# Patient Record
Sex: FEMALE | Race: WHITE | NOT HISPANIC OR LATINO | Employment: STUDENT | URBAN - METROPOLITAN AREA
[De-identification: names, ages, dates, MRNs, and addresses within clinical notes are randomized per-mention and may not be internally consistent; named-entity substitution may affect disease eponyms.]

---

## 2021-10-01 ENCOUNTER — IMMUNIZATION (OUTPATIENT)
Dept: PRIMARY CARE CLINIC | Facility: CLINIC | Age: 21
End: 2021-10-01

## 2021-10-01 DIAGNOSIS — Z23 NEED FOR VACCINATION: Primary | ICD-10-CM

## 2021-10-01 PROCEDURE — 0001A COVID-19, MRNA, LNP-S, PF, 30 MCG/0.3 ML DOSE VACCINE: CPT | Mod: CV19,PBBFAC | Performed by: FAMILY MEDICINE

## 2022-08-15 ENCOUNTER — OFFICE VISIT (OUTPATIENT)
Dept: OPHTHALMOLOGY | Facility: CLINIC | Age: 22
End: 2022-08-15
Payer: COMMERCIAL

## 2022-08-15 DIAGNOSIS — H52.13 MYOPIA OF BOTH EYES WITH ASTIGMATISM: ICD-10-CM

## 2022-08-15 DIAGNOSIS — R51.9 FREQUENT HEADACHES: Primary | ICD-10-CM

## 2022-08-15 DIAGNOSIS — H53.8 BLURRED VISION, BILATERAL: ICD-10-CM

## 2022-08-15 DIAGNOSIS — H52.203 MYOPIA OF BOTH EYES WITH ASTIGMATISM: ICD-10-CM

## 2022-08-15 PROCEDURE — 99999 PR PBB SHADOW E&M-EST. PATIENT-LVL I: CPT | Mod: PBBFAC,,, | Performed by: OPTOMETRIST

## 2022-08-15 PROCEDURE — 92015 DETERMINE REFRACTIVE STATE: CPT | Mod: S$GLB,,, | Performed by: OPTOMETRIST

## 2022-08-15 PROCEDURE — 92004 PR EYE EXAM, NEW PATIENT,COMPREHESV: ICD-10-PCS | Mod: S$GLB,,, | Performed by: OPTOMETRIST

## 2022-08-15 PROCEDURE — 1159F PR MEDICATION LIST DOCUMENTED IN MEDICAL RECORD: ICD-10-PCS | Mod: CPTII,S$GLB,, | Performed by: OPTOMETRIST

## 2022-08-15 PROCEDURE — 92004 COMPRE OPH EXAM NEW PT 1/>: CPT | Mod: S$GLB,,, | Performed by: OPTOMETRIST

## 2022-08-15 PROCEDURE — 1159F MED LIST DOCD IN RCRD: CPT | Mod: CPTII,S$GLB,, | Performed by: OPTOMETRIST

## 2022-08-15 PROCEDURE — 1160F PR REVIEW ALL MEDS BY PRESCRIBER/CLIN PHARMACIST DOCUMENTED: ICD-10-PCS | Mod: CPTII,S$GLB,, | Performed by: OPTOMETRIST

## 2022-08-15 PROCEDURE — 99999 PR PBB SHADOW E&M-EST. PATIENT-LVL I: ICD-10-PCS | Mod: PBBFAC,,, | Performed by: OPTOMETRIST

## 2022-08-15 PROCEDURE — 1160F RVW MEDS BY RX/DR IN RCRD: CPT | Mod: CPTII,S$GLB,, | Performed by: OPTOMETRIST

## 2022-08-15 PROCEDURE — 92015 PR REFRACTION: ICD-10-PCS | Mod: S$GLB,,, | Performed by: OPTOMETRIST

## 2022-08-15 NOTE — PROGRESS NOTES
HPI     Annual Exam     Comments: Vision changes since last eye exam?: yes, after low dose chemo   and steroids   Any eye pain today: no  Other ocular symptoms: migraines with scatoma's   Interested in contact lens fitting today? Yes   Family history macular degeneration, glaucoma and fukes dystrophy                      Last edited by Emilee Gardner MA on 8/15/2022 12:57 PM. (History)            Assessment /Plan     For exam results, see Encounter Report.    Headaches  Not likely ocular in origin  Minimal refractive error  Normal, well-perfused optic nerves bilaterally with no edema  Continue care with PCP and/or specialists     Blurred vision, bilateral    Myopia of both eyes with astigmatism    Eyeglass Final Rx     Eyeglass Final Rx       Sphere Cylinder Axis    Right -0.50 +0.50 078    Left -0.50 +0.50 175    Type: SVL    Expiration Date: 8/15/2023   PD=63               Mild astigmatism OU  I feel like va will be better with specs  If she desires cls, RTC for fitting undilated PRN, but expect better va with specs      RTC 1 yr for undilated eye exam or PRN if any problems.   Discussed above and answered questions.

## 2023-03-06 ENCOUNTER — OFFICE VISIT (OUTPATIENT)
Dept: URGENT CARE | Facility: CLINIC | Age: 23
End: 2023-03-06
Payer: COMMERCIAL

## 2023-03-06 VITALS
BODY MASS INDEX: 29.14 KG/M2 | HEART RATE: 98 BPM | SYSTOLIC BLOOD PRESSURE: 120 MMHG | HEIGHT: 64 IN | WEIGHT: 170.69 LBS | TEMPERATURE: 98 F | OXYGEN SATURATION: 99 % | RESPIRATION RATE: 18 BRPM | DIASTOLIC BLOOD PRESSURE: 82 MMHG

## 2023-03-06 DIAGNOSIS — R68.89 FLU-LIKE SYMPTOMS: ICD-10-CM

## 2023-03-06 DIAGNOSIS — J32.9 SINUSITIS, UNSPECIFIED CHRONICITY, UNSPECIFIED LOCATION: Primary | ICD-10-CM

## 2023-03-06 DIAGNOSIS — Z11.52 ENCOUNTER FOR SCREENING FOR SEVERE ACUTE RESPIRATORY SYNDROME CORONAVIRUS 2 (SARS-COV-2) INFECTION: ICD-10-CM

## 2023-03-06 LAB
CTP QC/QA: YES
CTP QC/QA: YES
FLUAV AG NPH QL: NEGATIVE
FLUBV AG NPH QL: NEGATIVE
SARS-COV-2 RDRP RESP QL NAA+PROBE: NEGATIVE

## 2023-03-06 PROCEDURE — 99203 PR OFFICE/OUTPT VISIT, NEW, LEVL III, 30-44 MIN: ICD-10-PCS | Mod: S$PBB,,,

## 2023-03-06 PROCEDURE — 99214 OFFICE O/P EST MOD 30 MIN: CPT | Mod: PBBFAC

## 2023-03-06 PROCEDURE — 99203 OFFICE O/P NEW LOW 30 MIN: CPT | Mod: S$PBB,,,

## 2023-03-06 PROCEDURE — 87804 INFLUENZA ASSAY W/OPTIC: CPT | Mod: 59,PBBFAC

## 2023-03-06 PROCEDURE — 87635 SARS-COV-2 COVID-19 AMP PRB: CPT | Mod: PBBFAC

## 2023-03-06 RX ORDER — AMOXICILLIN 500 MG/1
500 TABLET, FILM COATED ORAL EVERY 12 HOURS
Qty: 20 TABLET | Refills: 0 | Status: SHIPPED | OUTPATIENT
Start: 2023-03-06 | End: 2023-03-16

## 2023-03-06 RX ORDER — FLUTICASONE PROPIONATE 50 MCG
2 SPRAY, SUSPENSION (ML) NASAL DAILY
Qty: 18.2 ML | Refills: 0 | Status: SHIPPED | OUTPATIENT
Start: 2023-03-06

## 2023-03-06 NOTE — LETTER
March 6, 2023      [facility]  [facility] [address]  Phone: [phone]       Patient: Edith Hope   YOB: 2000  Date of Visit: 03/06/2023    To Whom It May Concern:    Phan Hope  was at [facility] on 03/06/2023. The patient may return to work/school on 03/08/2023 with no restrictions. If you have any questions or concerns, or if I can be of further assistance, please do not hesitate to contact me.    Sincerely,    TRAN Mendoza. LILYP

## 2023-03-06 NOTE — PROGRESS NOTES
"Subjective:       Patient ID: Edith Hope is a 23 y.o. female.    Vitals:  height is 5' 4" (1.626 m) and weight is 77.4 kg (170 lb 11.2 oz). Her oral temperature is 97.8 °F (36.6 °C). Her blood pressure is 120/82 and her pulse is 98. Her respiration is 18 and oxygen saturation is 99%.     Chief Complaint: Nasal Congestion (Suspected sinus infection or bronchitis. Lots of pressure in ears. - Entered by patient/xOne week) and Ear Fullness (Bilateral ears. xOne week)    Pt states nasal congestion and ear fullness for a week. Denies sick contacts.     Ear Fullness       Constitution: Negative.   HENT:  Positive for ear pain, congestion, sinus pain and sinus pressure.    Neck: neck negative.   Cardiovascular: Negative.    Eyes: Negative.    Respiratory: Negative.     Gastrointestinal: Negative.    Genitourinary: Negative.    Musculoskeletal: Negative.    Skin: Negative.    Neurological: Negative.      Objective:      Physical Exam   Constitutional: She is oriented to person, place, and time.   HENT:   Head: Normocephalic.   Ears:   Right Ear: External ear and ear canal normal. A middle ear effusion is present.   Left Ear: External ear and ear canal normal. A middle ear effusion is present.   Nose: Congestion present.   Mouth/Throat: Uvula is midline, oropharynx is clear and moist and mucous membranes are normal. Mucous membranes are moist. Oropharynx is clear.   Eyes: Pupils are equal, round, and reactive to light.   Cardiovascular: Normal rate, regular rhythm, normal heart sounds and normal pulses.   Pulmonary/Chest: Effort normal and breath sounds normal.   Abdominal: Normal appearance. Soft.   Musculoskeletal: Normal range of motion.         General: Normal range of motion.   Neurological: She is alert and oriented to person, place, and time.   Skin: Skin is warm and dry.   Vitals reviewed.      Results for orders placed or performed in visit on 03/06/23   POCT COVID-19 Rapid Screening   Result Value Ref " Range    POC Rapid COVID Negative Negative     Acceptable Yes    POCT Influenza A/B   Result Value Ref Range    Rapid Influenza A Ag Negative Negative    Rapid Influenza B Ag Negative Negative     Acceptable Yes        Assessment:       1. Sinusitis, unspecified chronicity, unspecified location    2. Encounter for screening for severe acute respiratory syndrome coronavirus 2 (SARS-CoV-2) infection    3. Flu-like symptoms            Plan:         Sinusitis, unspecified chronicity, unspecified location  -     amoxicillin (AMOXIL) 500 MG Tab; Take 1 tablet (500 mg total) by mouth every 12 (twelve) hours. for 10 days  Dispense: 20 tablet; Refill: 0  -     fluticasone propionate (FLONASE) 50 mcg/actuation nasal spray; 2 sprays (100 mcg total) by Each Nostril route once daily.  Dispense: 18.2 mL; Refill: 0    Encounter for screening for severe acute respiratory syndrome coronavirus 2 (SARS-CoV-2) infection  -     POCT COVID-19 Rapid Screening    Flu-like symptoms  -     POCT Influenza A/B      Please drink plenty of fluids.  Please get plenty of rest.    Take over the counter Tylenol (Acetaminophen) and/or Motrin (Ibuprofen) as directed for control of pain and/or fever.  Please follow up with your primary care doctor.     ER precautions given, patient verbalized understanding.     Please see provided patient education for guidance.    Follow up with PCP or return to clinic if symptoms worsen or do not improve.

## 2024-12-17 ENCOUNTER — OFFICE VISIT (OUTPATIENT)
Dept: PEDIATRICS | Facility: CLINIC | Age: 24
End: 2024-12-17
Payer: COMMERCIAL

## 2024-12-17 VITALS
WEIGHT: 190.81 LBS | DIASTOLIC BLOOD PRESSURE: 81 MMHG | HEIGHT: 64 IN | SYSTOLIC BLOOD PRESSURE: 113 MMHG | HEART RATE: 92 BPM | TEMPERATURE: 98 F | BODY MASS INDEX: 32.58 KG/M2

## 2024-12-17 DIAGNOSIS — M31.19 TTP (THROMBOTIC THROMBOPENIC PURPURA): Primary | ICD-10-CM

## 2024-12-17 DIAGNOSIS — D69.3 IDIOPATHIC THROMBOCYTOPENIC PURPURA (ITP): ICD-10-CM

## 2024-12-17 DIAGNOSIS — E66.3 OVERWEIGHT: ICD-10-CM

## 2024-12-17 DIAGNOSIS — Z00.00 WELL ADULT EXAM: ICD-10-CM

## 2024-12-17 PROCEDURE — 86580 TB INTRADERMAL TEST: CPT | Mod: S$GLB,,, | Performed by: PEDIATRICS

## 2024-12-17 PROCEDURE — 3074F SYST BP LT 130 MM HG: CPT | Mod: CPTII,S$GLB,, | Performed by: PEDIATRICS

## 2024-12-17 PROCEDURE — 99385 PREV VISIT NEW AGE 18-39: CPT | Mod: 25,S$GLB,, | Performed by: PEDIATRICS

## 2024-12-17 PROCEDURE — 3008F BODY MASS INDEX DOCD: CPT | Mod: CPTII,S$GLB,, | Performed by: PEDIATRICS

## 2024-12-17 PROCEDURE — 99999 PR PBB SHADOW E&M-EST. PATIENT-LVL III: CPT | Mod: PBBFAC,,, | Performed by: PEDIATRICS

## 2024-12-17 PROCEDURE — 1159F MED LIST DOCD IN RCRD: CPT | Mod: CPTII,S$GLB,, | Performed by: PEDIATRICS

## 2024-12-17 PROCEDURE — 3079F DIAST BP 80-89 MM HG: CPT | Mod: CPTII,S$GLB,, | Performed by: PEDIATRICS

## 2024-12-17 PROCEDURE — 99401 PREV MED CNSL INDIV APPRX 15: CPT | Mod: S$GLB,,, | Performed by: PEDIATRICS

## 2024-12-17 NOTE — PROGRESS NOTES
"SUBJECTIVE:  Edith Hope is a 24 y.o. female who is here for a well checkup alone.    HPI  Current concerns include PA school forms.    Edith's allergies, medications, history, and problem list were updated as appropriate.    Review of Systems:    Social Screening:  Family living situation/lives with: Mom and Dad  School/grade: [employer]  Current performance: n/a  Accommodations? no    Nutrition:  Current diet: eat well  Vitamins/Supplements? Multivitamin    Elimination:  Stool problems? no  Urine Problems? no    Menses:  Patient's last menstrual period was 12/02/2024 (approximate).     Sleep:  Sleep problems? no    Dental:  Brushes teeth regularly? Yes  Dental home? Yes    Activity:  After school activities/physically active? Yes    Concerns regarding:  Hearing? no  Vision? no - glasses  Social interactions? no  Anxiety/Depression? no         No data to display                OBJECTIVE:  Vital signs  Vitals:    12/17/24 1407   BP: 113/81   BP Location: Right arm   Patient Position: Sitting   Pulse: 92   Temp: 97.9 °F (36.6 °C)   TempSrc: Oral   Weight: 86.5 kg (190 lb 12.8 oz)   Height: 5' 4" (1.626 m)     Body mass index is 32.75 kg/m². Facility age limit for growth %jayjay is 20 years.     Physical Exam       ASSESSMENT/PLAN:  Edith was seen today for well adolescent.    Diagnoses and all orders for this visit:    TTP (thrombotic thrombopenic purpura)    Idiopathic thrombocytopenic purpura (ITP)    Well adult exam  -     Comprehensive Metabolic Panel; Future  -     Lipid Panel; Future  -     CBC Auto Differential; Future  -     Hemoglobin A1C; Future  -     TSH; Future  -     hpv vaccine,9-laina (GARDASIL 9) vaccine 0.5 mL  -     POCT TB Skin Test    Overweight     Overweight/Obesity Counselling:    More than 15 min in counselling for obesity/discussion of weight, effects, long term consequences and short term consequences:    Goals:  Stay the same " weight.    Weight check at home weekly (same day of the week, same time of day, same clothes on)    Decrease intake:   Leave 2 bites of every food (do not finish any meals).          Drink only no-calorie drinks.  Increase activity:   Walk 45 min every day    See back here in 4 weeks to recheck weight and progress of weight management.      Preventive Health Issues Addressed:  1. Anticipatory guidance discussed and a handout covering well-child issues at this age was provided.     2. Age appropriate weight management counseling was provided regarding nutrition and physical activity.    4. Immunizations and screening tests today: per orders.    Follow Up:  No follow-ups on file.

## 2024-12-19 ENCOUNTER — LAB VISIT (OUTPATIENT)
Dept: LAB | Facility: HOSPITAL | Age: 24
End: 2024-12-19
Attending: PEDIATRICS
Payer: COMMERCIAL

## 2024-12-19 DIAGNOSIS — Z00.00 WELL ADULT EXAM: ICD-10-CM

## 2024-12-19 LAB
ALBUMIN SERPL BCP-MCNC: 4.1 G/DL (ref 3.5–5.2)
ALP SERPL-CCNC: 65 U/L (ref 40–150)
ALT SERPL W/O P-5'-P-CCNC: 19 U/L (ref 10–44)
ANION GAP SERPL CALC-SCNC: 9 MMOL/L (ref 8–16)
AST SERPL-CCNC: 18 U/L (ref 10–40)
BASOPHILS # BLD AUTO: 0.05 K/UL (ref 0–0.2)
BASOPHILS NFR BLD: 0.6 % (ref 0–1.9)
BILIRUB SERPL-MCNC: 0.7 MG/DL (ref 0.1–1)
BUN SERPL-MCNC: 9 MG/DL (ref 6–20)
CALCIUM SERPL-MCNC: 9.9 MG/DL (ref 8.7–10.5)
CHLORIDE SERPL-SCNC: 106 MMOL/L (ref 95–110)
CHOLEST SERPL-MCNC: 182 MG/DL (ref 120–199)
CHOLEST/HDLC SERPL: 3.2 {RATIO} (ref 2–5)
CO2 SERPL-SCNC: 23 MMOL/L (ref 23–29)
CREAT SERPL-MCNC: 0.8 MG/DL (ref 0.5–1.4)
DIFFERENTIAL METHOD BLD: NORMAL
EOSINOPHIL # BLD AUTO: 0.1 K/UL (ref 0–0.5)
EOSINOPHIL NFR BLD: 1.5 % (ref 0–8)
ERYTHROCYTE [DISTWIDTH] IN BLOOD BY AUTOMATED COUNT: 12.4 % (ref 11.5–14.5)
EST. GFR  (NO RACE VARIABLE): >60 ML/MIN/1.73 M^2
ESTIMATED AVG GLUCOSE: 94 MG/DL (ref 68–131)
GLUCOSE SERPL-MCNC: 89 MG/DL (ref 70–110)
HBA1C MFR BLD: 4.9 % (ref 4–5.6)
HCT VFR BLD AUTO: 43.3 % (ref 37–48.5)
HDLC SERPL-MCNC: 57 MG/DL (ref 40–75)
HDLC SERPL: 31.3 % (ref 20–50)
HGB BLD-MCNC: 14.2 G/DL (ref 12–16)
IMM GRANULOCYTES # BLD AUTO: 0.02 K/UL (ref 0–0.04)
IMM GRANULOCYTES NFR BLD AUTO: 0.2 % (ref 0–0.5)
LDLC SERPL CALC-MCNC: 113.4 MG/DL (ref 63–159)
LYMPHOCYTES # BLD AUTO: 2.1 K/UL (ref 1–4.8)
LYMPHOCYTES NFR BLD: 23.3 % (ref 18–48)
MCH RBC QN AUTO: 29.5 PG (ref 27–31)
MCHC RBC AUTO-ENTMCNC: 32.8 G/DL (ref 32–36)
MCV RBC AUTO: 90 FL (ref 82–98)
MONOCYTES # BLD AUTO: 0.7 K/UL (ref 0.3–1)
MONOCYTES NFR BLD: 7.8 % (ref 4–15)
NEUTROPHILS # BLD AUTO: 6 K/UL (ref 1.8–7.7)
NEUTROPHILS NFR BLD: 66.6 % (ref 38–73)
NONHDLC SERPL-MCNC: 125 MG/DL
NRBC BLD-RTO: 0 /100 WBC
PLATELET # BLD AUTO: 254 K/UL (ref 150–450)
PMV BLD AUTO: 11.5 FL (ref 9.2–12.9)
POTASSIUM SERPL-SCNC: 4.3 MMOL/L (ref 3.5–5.1)
PROT SERPL-MCNC: 7.2 G/DL (ref 6–8.4)
RBC # BLD AUTO: 4.82 M/UL (ref 4–5.4)
SODIUM SERPL-SCNC: 138 MMOL/L (ref 136–145)
TRIGL SERPL-MCNC: 58 MG/DL (ref 30–150)
TSH SERPL DL<=0.005 MIU/L-ACNC: 1.48 UIU/ML (ref 0.4–4)
WBC # BLD AUTO: 8.96 K/UL (ref 3.9–12.7)

## 2024-12-19 PROCEDURE — 36415 COLL VENOUS BLD VENIPUNCTURE: CPT | Performed by: PEDIATRICS

## 2024-12-19 PROCEDURE — 84443 ASSAY THYROID STIM HORMONE: CPT | Performed by: PEDIATRICS

## 2024-12-19 PROCEDURE — 80053 COMPREHEN METABOLIC PANEL: CPT | Performed by: PEDIATRICS

## 2024-12-19 PROCEDURE — 80061 LIPID PANEL: CPT | Performed by: PEDIATRICS

## 2024-12-19 PROCEDURE — 83036 HEMOGLOBIN GLYCOSYLATED A1C: CPT | Performed by: PEDIATRICS

## 2024-12-19 PROCEDURE — 85025 COMPLETE CBC W/AUTO DIFF WBC: CPT | Performed by: PEDIATRICS

## 2024-12-20 ENCOUNTER — PATIENT MESSAGE (OUTPATIENT)
Dept: PEDIATRICS | Facility: CLINIC | Age: 24
End: 2024-12-20
Payer: COMMERCIAL

## 2024-12-20 LAB
TB INDURATION 48 - 72 HR READ: NORMAL
TB SKIN TEST 48 - 72 HR READ: NORMAL